# Patient Record
Sex: FEMALE | Race: WHITE | Employment: UNEMPLOYED | URBAN - METROPOLITAN AREA
[De-identification: names, ages, dates, MRNs, and addresses within clinical notes are randomized per-mention and may not be internally consistent; named-entity substitution may affect disease eponyms.]

---

## 2019-11-14 ENCOUNTER — HOSPITAL ENCOUNTER (EMERGENCY)
Age: 44
Discharge: HOME | End: 2019-11-15
Attending: EMERGENCY MEDICINE
Payer: COMMERCIAL

## 2019-11-14 ENCOUNTER — APPOINTMENT (OUTPATIENT)
Dept: GENERAL RADIOLOGY | Age: 44
End: 2019-11-14
Payer: COMMERCIAL

## 2019-11-14 VITALS
DIASTOLIC BLOOD PRESSURE: 65 MMHG | RESPIRATION RATE: 18 BRPM | TEMPERATURE: 97.8 F | OXYGEN SATURATION: 100 % | WEIGHT: 120 LBS | BODY MASS INDEX: 20.49 KG/M2 | HEART RATE: 103 BPM | SYSTOLIC BLOOD PRESSURE: 107 MMHG | HEIGHT: 64 IN
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
## 2019-11-14 DIAGNOSIS — S39.012A STRAIN OF LUMBAR REGION, INITIAL ENCOUNTER: Primary | ICD-10-CM

## 2019-11-14 LAB — HCG(URINE) PREGNANCY TEST: NEGATIVE

## 2019-11-14 PROCEDURE — 84703 CHORIONIC GONADOTROPIN ASSAY: CPT

## 2019-11-14 PROCEDURE — 6370000000 HC RX 637 (ALT 250 FOR IP): Performed by: PHYSICIAN ASSISTANT

## 2019-11-14 PROCEDURE — 72100 X-RAY EXAM L-S SPINE 2/3 VWS: CPT

## 2019-11-14 PROCEDURE — 99283 EMERGENCY DEPT VISIT LOW MDM: CPT

## 2019-11-14 RX ORDER — LIDOCAINE 4 G/G
1 PATCH TOPICAL ONCE
Status: DISCONTINUED | OUTPATIENT
Start: 2019-11-14 | End: 2019-11-15 | Stop reason: HOSPADM

## 2019-11-14 RX ORDER — OXYCODONE HYDROCHLORIDE 5 MG/1
5 TABLET ORAL ONCE
Status: COMPLETED | OUTPATIENT
Start: 2019-11-14 | End: 2019-11-14

## 2019-11-14 RX ORDER — DRONABINOL 5 MG/1
CAPSULE ORAL
Refills: 0 | COMMUNITY
Start: 2019-11-13

## 2019-11-14 RX ORDER — DIMETHYL FUMARATE 240 MG/1
240 CAPSULE ORAL
COMMUNITY
Start: 2018-08-23

## 2019-11-14 RX ORDER — METHOCARBAMOL 500 MG/1
1000 TABLET, FILM COATED ORAL ONCE
Status: COMPLETED | OUTPATIENT
Start: 2019-11-14 | End: 2019-11-14

## 2019-11-14 RX ADMIN — OXYCODONE HYDROCHLORIDE 5 MG: 5 TABLET ORAL at 22:59

## 2019-11-14 RX ADMIN — METHOCARBAMOL TABLETS 1000 MG: 500 TABLET, COATED ORAL at 22:59

## 2019-11-14 ASSESSMENT — PAIN SCALES - GENERAL
PAINLEVEL_OUTOF10: 10
PAINLEVEL_OUTOF10: 10

## 2019-11-14 ASSESSMENT — PAIN DESCRIPTION - ORIENTATION: ORIENTATION: LOWER

## 2019-11-14 ASSESSMENT — PAIN DESCRIPTION - DESCRIPTORS: DESCRIPTORS: ACHING

## 2019-11-14 ASSESSMENT — PAIN DESCRIPTION - LOCATION: LOCATION: BACK

## 2019-11-14 ASSESSMENT — PAIN DESCRIPTION - FREQUENCY: FREQUENCY: CONTINUOUS

## 2019-11-14 ASSESSMENT — PAIN DESCRIPTION - PAIN TYPE: TYPE: ACUTE PAIN

## 2019-11-15 RX ORDER — METHOCARBAMOL 500 MG/1
1000 TABLET, FILM COATED ORAL 3 TIMES DAILY
Qty: 60 TABLET | Refills: 0 | Status: SHIPPED | OUTPATIENT
Start: 2019-11-15 | End: 2019-11-25

## 2019-11-15 RX ORDER — LIDOCAINE 50 MG/G
1 PATCH TOPICAL DAILY
Qty: 10 PATCH | Refills: 0 | Status: SHIPPED | OUTPATIENT
Start: 2019-11-15 | End: 2019-11-25

## 2019-11-15 RX ORDER — OXYCODONE HYDROCHLORIDE 5 MG/1
5 TABLET ORAL EVERY 6 HOURS PRN
Qty: 12 TABLET | Refills: 0 | Status: SHIPPED | OUTPATIENT
Start: 2019-11-15 | End: 2019-11-20

## 2019-11-25 ASSESSMENT — ENCOUNTER SYMPTOMS
EYE ITCHING: 0
CHEST TIGHTNESS: 0
NAUSEA: 0
COUGH: 0
COLOR CHANGE: 0
SINUS PRESSURE: 0
ABDOMINAL PAIN: 0
EYE PAIN: 0
ABDOMINAL DISTENTION: 0
EYE REDNESS: 0
BACK PAIN: 1
SORE THROAT: 0
SHORTNESS OF BREATH: 0
WHEEZING: 0
DIARRHEA: 0
RHINORRHEA: 0
VOMITING: 0

## 2020-01-16 ENCOUNTER — HOSPITAL ENCOUNTER (EMERGENCY)
Age: 45
Discharge: HOME | End: 2020-01-17
Attending: EMERGENCY MEDICINE
Payer: COMMERCIAL

## 2020-01-16 PROCEDURE — 99283 EMERGENCY DEPT VISIT LOW MDM: CPT

## 2020-01-16 ASSESSMENT — PAIN SCALES - GENERAL: PAINLEVEL_OUTOF10: 8

## 2020-01-17 VITALS
HEART RATE: 76 BPM | SYSTOLIC BLOOD PRESSURE: 110 MMHG | OXYGEN SATURATION: 100 % | RESPIRATION RATE: 16 BRPM | DIASTOLIC BLOOD PRESSURE: 81 MMHG | TEMPERATURE: 97.9 F

## 2020-01-17 LAB
ANION GAP SERPL CALCULATED.3IONS-SCNC: 12 MMOL/L (ref 3–16)
BASOPHILS ABSOLUTE: 0 K/UL (ref 0–0.2)
BASOPHILS RELATIVE PERCENT: 1 %
BILIRUBIN URINE: NEGATIVE
BLOOD, URINE: NEGATIVE
BUN BLDV-MCNC: 10 MG/DL (ref 7–20)
CALCIUM SERPL-MCNC: 9.6 MG/DL (ref 8.3–10.6)
CHLORIDE BLD-SCNC: 97 MMOL/L (ref 99–110)
CLARITY: CLEAR
CO2: 30 MMOL/L (ref 21–32)
COLOR: YELLOW
CREAT SERPL-MCNC: <0.5 MG/DL (ref 0.6–1.1)
EOSINOPHILS ABSOLUTE: 0.2 K/UL (ref 0–0.6)
EOSINOPHILS RELATIVE PERCENT: 5.2 %
GFR AFRICAN AMERICAN: >60
GFR NON-AFRICAN AMERICAN: >60
GLUCOSE BLD-MCNC: 89 MG/DL (ref 70–99)
GLUCOSE URINE: NEGATIVE MG/DL
HCT VFR BLD CALC: 36.8 % (ref 36–48)
HEMOGLOBIN: 12.1 G/DL (ref 12–16)
KETONES, URINE: NEGATIVE MG/DL
LEUKOCYTE ESTERASE, URINE: NEGATIVE
LYMPHOCYTES ABSOLUTE: 1.1 K/UL (ref 1–5.1)
LYMPHOCYTES RELATIVE PERCENT: 27.3 %
MCH RBC QN AUTO: 29.9 PG (ref 26–34)
MCHC RBC AUTO-ENTMCNC: 33 G/DL (ref 31–36)
MCV RBC AUTO: 90.4 FL (ref 80–100)
MICROSCOPIC EXAMINATION: NORMAL
MONOCYTES ABSOLUTE: 0.3 K/UL (ref 0–1.3)
MONOCYTES RELATIVE PERCENT: 8.3 %
NEUTROPHILS ABSOLUTE: 2.4 K/UL (ref 1.7–7.7)
NEUTROPHILS RELATIVE PERCENT: 58.2 %
NITRITE, URINE: NEGATIVE
PDW BLD-RTO: 13.6 % (ref 12.4–15.4)
PH UA: 6.5 (ref 5–8)
PLATELET # BLD: 274 K/UL (ref 135–450)
PMV BLD AUTO: 7.7 FL (ref 5–10.5)
POTASSIUM SERPL-SCNC: 3.7 MMOL/L (ref 3.5–5.1)
PROTEIN UA: NEGATIVE MG/DL
RBC # BLD: 4.07 M/UL (ref 4–5.2)
SODIUM BLD-SCNC: 139 MMOL/L (ref 136–145)
SPECIFIC GRAVITY UA: 1.01 (ref 1–1.03)
URINE TYPE: NORMAL
UROBILINOGEN, URINE: 0.2 E.U./DL
WBC # BLD: 4.2 K/UL (ref 4–11)

## 2020-01-17 PROCEDURE — 80048 BASIC METABOLIC PNL TOTAL CA: CPT

## 2020-01-17 PROCEDURE — 81003 URINALYSIS AUTO W/O SCOPE: CPT

## 2020-01-17 PROCEDURE — 85025 COMPLETE CBC W/AUTO DIFF WBC: CPT

## 2020-01-17 RX ORDER — DOCUSATE SODIUM 100 MG/1
100 CAPSULE, LIQUID FILLED ORAL 2 TIMES DAILY
Qty: 60 CAPSULE | Refills: 0 | Status: SHIPPED | OUTPATIENT
Start: 2020-01-17 | End: 2020-02-16

## 2020-01-17 RX ORDER — MAGNESIUM CARB/ALUMINUM HYDROX 105-160MG
296 TABLET,CHEWABLE ORAL ONCE
Qty: 1 BOTTLE | Refills: 0 | Status: SHIPPED | OUTPATIENT
Start: 2020-01-17 | End: 2020-01-17

## 2020-01-17 RX ORDER — KETOROLAC TROMETHAMINE 30 MG/ML
30 INJECTION, SOLUTION INTRAMUSCULAR; INTRAVENOUS ONCE
Status: DISCONTINUED | OUTPATIENT
Start: 2020-01-17 | End: 2020-01-17 | Stop reason: HOSPADM

## 2020-01-17 RX ORDER — POLYETHYLENE GLYCOL 3350 17 G/17G
17 POWDER, FOR SOLUTION ORAL DAILY
Qty: 1 BOTTLE | Refills: 0 | Status: SHIPPED | OUTPATIENT
Start: 2020-01-17 | End: 2020-01-24

## 2020-01-17 ASSESSMENT — ENCOUNTER SYMPTOMS
VOMITING: 0
CHEST TIGHTNESS: 0
NAUSEA: 0
CONSTIPATION: 1
BACK PAIN: 1
SHORTNESS OF BREATH: 0

## 2020-01-17 NOTE — ED PROVIDER NOTES
[address] [facility]          ATTENDING PHYSICIAN NOTE       Date of evaluation: 1/16/2020    Chief Complaint     Constipation and Abdominal Pain      History of Present Illness     Brooks Haider is a 40 y.o. female who presents with suprapubic abdominal pain and constipation. Patient has a history of uterine prolapse and she believes her uterus is prolapsed again. She recently finished her menstrual cycle a couple days ago. She also reports constipation, last bowel movement was 4 days ago. .  She denied any nausea or vomiting. She intermittently takes MiraLAX but does not take this regularly. She has increased urinary frequency but denied any dysuria or hematuria. She notes lower back pain but it is bilateral and constant. Denied any fevers or chills. Review of Systems     Review of Systems   Constitutional: Negative for activity change, appetite change, chills and fever. Respiratory: Negative for chest tightness and shortness of breath. Cardiovascular: Negative for chest pain. Gastrointestinal: Positive for constipation. Negative for nausea and vomiting. Suprapubic abdominal pain    Genitourinary: Positive for frequency and vaginal pain. Negative for dysuria, flank pain and hematuria. Musculoskeletal: Positive for back pain. Skin: Negative. Neurological: Negative for dizziness and syncope. Past Medical, Surgical, Family, and Social History     She has a past medical history of Anxiety, Brain tumor ([address] Depression, MS (multiple sclerosis) ([address] Multiple sclerosis ([address] Oligodendroglioma ([address] and Pap smear for cervical cancer screening. She has a past surgical history that includes Thyroidectomy, partial; Brain Biopsy (2008); [place] tooth extraction; and Thyroidectomy. Her family history includes Cancer in her maternal grandmother. She reports that she quit smoking about 8 years ago. Her smoking use included cigarettes.  She has a %    MCV 90.4 80.0 - 100.0 fL    MCH 29.9 26.0 - 34.0 pg    MCHC 33.0 31.0 - 36.0 g/dL    RDW 13.6 12.4 - 15.4 %    Platelets 212 031 - 350 K/uL    MPV 7.7 5.0 - 10.5 fL    Neutrophils % 58.2 %    Lymphocytes % 27.3 %    Monocytes % 8.3 %    Eosinophils % 5.2 %    Basophils % 1.0 %    Neutrophils Absolute 2.4 1.7 - 7.7 K/uL    Lymphocytes Absolute 1.1 1.0 - 5.1 K/uL    Monocytes Absolute 0.3 0.0 - 1.3 K/uL    Eosinophils Absolute 0.2 0.0 - 0.6 K/uL    Basophils Absolute 0.0 0.0 - 0.2 K/uL   Basic Metabolic Panel   Result Value Ref Range    Sodium 139 136 - 145 mmol/L    Potassium 3.7 3.5 - 5.1 mmol/L    Chloride 97 (L) 99 - 110 mmol/L    CO2 30 21 - 32 mmol/L    Anion Gap 12 3 - 16    Glucose 89 70 - 99 mg/dL    BUN 10 7 - 20 mg/dL    CREATININE <0.5 (L) 0.6 - 1.1 mg/dL    GFR Non-African American >60 >60    GFR African American >60 >60    Calcium 9.6 8.3 - 10.6 mg/dL   Urinalysis   Result Value Ref Range    Color, UA Yellow Straw/Yellow    Clarity, UA Clear Clear    Glucose, Ur Negative Negative mg/dL    Bilirubin Urine Negative Negative    Ketones, Urine Negative Negative mg/dL    Specific Gravity, UA 1.010 1.005 - 1.030    Blood, Urine Negative Negative    pH, UA 6.5 5.0 - 8.0    Protein, UA Negative Negative mg/dL    Urobilinogen, Urine 0.2 <2.0 E.U./dL    Nitrite, Urine Negative Negative    Leukocyte Esterase, Urine Negative Negative    Microscopic Examination Not Indicated     Urine Type Voided        RECENT VITALS:  BP: 110/81,Temp: 97.9 °F (36.6 °C), Pulse: 76, Resp: 16, SpO2: 100 %     Procedures     none    ED Course     Nursing Notes, Past Medical Hx, Past Surgical Hx, Social Hx,Allergies, and Family Hx were reviewed.     patient was given the following medications:  Orders Placed This Encounter   Medications    DISCONTD: ketorolac (TORADOL) injection 30 mg    polyethylene glycol (MIRALAX) powder     Sig: Take 17 g by mouth daily for 7 days PRN constipation     Dispense:  1 Bottle     Refill:  0    docusate sodium (COLACE) 100 MG capsule     Sig: Take 1 capsule by mouth 2 times daily     Dispense:  60 capsule     Refill:  0    Magnesium Citrate 1.745 GM/30ML solution     Sig: Take 296 mLs by mouth once for 1 dose     Dispense:  1 Bottle     Refill:  0       CONSULTS:  IP CONSULT TO OB GYN    MEDICAL DECISIONMAKING / ASSESSMENT / Yusef Tk is a 40 y.o. female who presents with lower abdominal pain and constipation. Patient has a history of uterine prolapse. She believes she may have recurrence of her uterine prolapse since she has been straining to have a bowel movement. Last bowel movement was 4 days ago. She is passing gas. Patient intermittently takes MiraLAX does not take this regularly. On exam patient had no anterior abdominal tenderness to palpation, no rebound or guarding, no tympany to percussion, no clinical evidence of ileus or small bowel obstruction. Chaperoned pelvic exam was performed which showed uterine prolapse approximately 4 inches, mucosa was pink, rectal exam did not show any evidence of rectal prolapse. I spoke to gynecology on call (Dr. Sonya Hayden) no acute ED or inpatient interventions are needed at this time. Patient can follow-up as an outpatient with either her OB/GYN or a uro-gynecologist.  Patient told me she has been referred to a uro-gynecologist but has not followed up with them. Patient was able to urinate. Urinalysis was normal and reassuring. No evidence of UTI. Patient did not require any labs but asked that I obtain a CBC with differential because this is recommended by her neurologist.  She states she has a hard time getting to various doctors appointments and obtaining a CBC here will save her an extra trip to outpatient lab. This was obtained as well as BMP. Labs were normal and reassuring. Patient was provided the phone number for OB/GYN and a uro-gynecologist.  will follow-up as an outpatient. Clinical Impression     1. Constipation, unspecified constipation type    2. Uterine prolapse        Disposition     PATIENT REFERRED TO:  Maggy Ferro MD  [address] [facility]. [address]  [phone]    Call today      Daphne Adams.   [address] [place] [address]  [phone]    Today        DISCHARGE MEDICATIONS:  Discharge Medication List as of 1/17/2020  2:44 AM      START taking these medications    Details   polyethylene glycol (MIRALAX) powder Take 17 g by mouth daily for 7 days PRN constipation, Disp-1 Bottle, R-0Print      docusate sodium (COLACE) 100 MG capsule Take 1 capsule by mouth 2 times daily, Disp-60 capsule, R-0Print      Magnesium Citrate 1.745 GM/30ML solution Take 296 mLs by mouth once for 1 dose, Disp-1 Bottle, R-0Print             DISPOSITION Decision To Discharge 01/17/2020 02:43:46 AM         Anne-Marie Olvera MD  01/17/20 [id number]

## 2020-01-17 NOTE — ED NOTES
Patient reports pubic pain, diffuse abdominal cramping and constipation x4-5 days. Patient also reports possible uterine prolapse for several months, worsened over the past week. Pain 8/10.       Jose Rowley RN  01/16/20 4953

## 2020-01-17 NOTE — ED NOTES
Discharge instructions and prescriptions reviewed with patient. Pt verbalized understanding. Pt discharged home with family.       Latonya Smith RN  01/17/20 036

## 2020-01-17 NOTE — ED NOTES
Alfredo Marcos MD holding labs until after the consult with Gynecology.      Chivo Bro RN  01/17/20 0062

## 2022-01-13 ENCOUNTER — APPOINTMENT (OUTPATIENT)
Dept: GENERAL RADIOLOGY | Age: 47
End: 2022-01-13
Payer: COMMERCIAL

## 2022-01-13 ENCOUNTER — HOSPITAL ENCOUNTER (EMERGENCY)
Age: 47
Discharge: HOME | End: 2022-01-13
Attending: STUDENT IN AN ORGANIZED HEALTH CARE EDUCATION/TRAINING PROGRAM
Payer: COMMERCIAL

## 2022-01-13 VITALS
BODY MASS INDEX: 20.49 KG/M2 | HEART RATE: 103 BPM | HEIGHT: 64 IN | DIASTOLIC BLOOD PRESSURE: 67 MMHG | SYSTOLIC BLOOD PRESSURE: 115 MMHG | OXYGEN SATURATION: 96 % | WEIGHT: 120 LBS | TEMPERATURE: 97.6 F | RESPIRATION RATE: 16 BRPM

## 2022-01-13 DIAGNOSIS — S60.221A CONTUSION OF RIGHT HAND, INITIAL ENCOUNTER: Primary | ICD-10-CM

## 2022-01-13 DIAGNOSIS — K08.89 PAIN, DENTAL: ICD-10-CM

## 2022-01-13 PROCEDURE — 73110 X-RAY EXAM OF WRIST: CPT

## 2022-01-13 PROCEDURE — 99283 EMERGENCY DEPT VISIT LOW MDM: CPT

## 2022-01-13 PROCEDURE — 73130 X-RAY EXAM OF HAND: CPT

## 2022-01-13 ASSESSMENT — ENCOUNTER SYMPTOMS
CHEST TIGHTNESS: 0
CONSTIPATION: 0
WHEEZING: 0
EYE DISCHARGE: 0
EYE ITCHING: 0
SORE THROAT: 0
RHINORRHEA: 0
VOICE CHANGE: 0
ABDOMINAL PAIN: 0
ANAL BLEEDING: 0
BACK PAIN: 0
SHORTNESS OF BREATH: 0
ABDOMINAL DISTENTION: 0
VOMITING: 0
EYE PAIN: 0
DIARRHEA: 0

## 2022-01-13 ASSESSMENT — PAIN DESCRIPTION - ORIENTATION: ORIENTATION: RIGHT

## 2022-01-13 ASSESSMENT — PAIN DESCRIPTION - PAIN TYPE: TYPE: ACUTE PAIN

## 2022-01-14 ENCOUNTER — HOSPITAL ENCOUNTER (EMERGENCY)
Age: 47
Discharge: HOME | End: 2022-01-14
Attending: STUDENT IN AN ORGANIZED HEALTH CARE EDUCATION/TRAINING PROGRAM
Payer: COMMERCIAL

## 2022-01-14 VITALS
OXYGEN SATURATION: 98 % | RESPIRATION RATE: 23 BRPM | TEMPERATURE: 97.9 F | DIASTOLIC BLOOD PRESSURE: 74 MMHG | HEART RATE: 77 BPM | SYSTOLIC BLOOD PRESSURE: 104 MMHG

## 2022-01-14 DIAGNOSIS — G25.71 AKATHISIA: Primary | ICD-10-CM

## 2022-01-14 LAB
A/G RATIO: 2.2 (ref 1.1–2.2)
ALBUMIN SERPL-MCNC: 4.6 G/DL (ref 3.4–5)
ALP BLD-CCNC: 37 U/L (ref 40–129)
ALT SERPL-CCNC: <5 U/L (ref 10–40)
ANION GAP SERPL CALCULATED.3IONS-SCNC: 15 MMOL/L (ref 3–16)
AST SERPL-CCNC: 15 U/L (ref 15–37)
BASOPHILS ABSOLUTE: 0 K/UL (ref 0–0.2)
BASOPHILS RELATIVE PERCENT: 0.6 %
BILIRUB SERPL-MCNC: 0.3 MG/DL (ref 0–1)
BILIRUBIN URINE: NEGATIVE
BLOOD, URINE: NEGATIVE
BUN BLDV-MCNC: 15 MG/DL (ref 7–20)
CALCIUM SERPL-MCNC: 8.5 MG/DL (ref 8.3–10.6)
CHLORIDE BLD-SCNC: 99 MMOL/L (ref 99–110)
CLARITY: CLEAR
CO2: 22 MMOL/L (ref 21–32)
COLOR: YELLOW
CREAT SERPL-MCNC: <0.5 MG/DL (ref 0.6–1.1)
EOSINOPHILS ABSOLUTE: 0 K/UL (ref 0–0.6)
EOSINOPHILS RELATIVE PERCENT: 0.6 %
GFR AFRICAN AMERICAN: >60
GFR NON-AFRICAN AMERICAN: >60
GLUCOSE BLD-MCNC: 107 MG/DL (ref 70–99)
GLUCOSE URINE: NEGATIVE MG/DL
HCT VFR BLD CALC: 34.6 % (ref 36–48)
HEMOGLOBIN: 11.5 G/DL (ref 12–16)
KETONES, URINE: >=80 MG/DL
LEUKOCYTE ESTERASE, URINE: NEGATIVE
LYMPHOCYTES ABSOLUTE: 0.7 K/UL (ref 1–5.1)
LYMPHOCYTES RELATIVE PERCENT: 11.3 %
MCH RBC QN AUTO: 30.7 PG (ref 26–34)
MCHC RBC AUTO-ENTMCNC: 33.2 G/DL (ref 31–36)
MCV RBC AUTO: 92.4 FL (ref 80–100)
MICROSCOPIC EXAMINATION: ABNORMAL
MONOCYTES ABSOLUTE: 0.3 K/UL (ref 0–1.3)
MONOCYTES RELATIVE PERCENT: 5.6 %
NEUTROPHILS ABSOLUTE: 5 K/UL (ref 1.7–7.7)
NEUTROPHILS RELATIVE PERCENT: 81.9 %
NITRITE, URINE: NEGATIVE
PDW BLD-RTO: 13.5 % (ref 12.4–15.4)
PH UA: 7.5 (ref 5–8)
PLATELET # BLD: 263 K/UL (ref 135–450)
PMV BLD AUTO: 7.7 FL (ref 5–10.5)
POTASSIUM REFLEX MAGNESIUM: 3.8 MMOL/L (ref 3.5–5.1)
PROTEIN UA: NEGATIVE MG/DL
RBC # BLD: 3.75 M/UL (ref 4–5.2)
SODIUM BLD-SCNC: 136 MMOL/L (ref 136–145)
SPECIFIC GRAVITY UA: 1.01 (ref 1–1.03)
TOTAL CK: 98 U/L (ref 26–192)
TOTAL PROTEIN: 6.7 G/DL (ref 6.4–8.2)
URINE TYPE: ABNORMAL
UROBILINOGEN, URINE: 0.2 E.U./DL
WBC # BLD: 6.2 K/UL (ref 4–11)

## 2022-01-14 PROCEDURE — 80053 COMPREHEN METABOLIC PANEL: CPT

## 2022-01-14 PROCEDURE — 99283 EMERGENCY DEPT VISIT LOW MDM: CPT

## 2022-01-14 PROCEDURE — 85025 COMPLETE CBC W/AUTO DIFF WBC: CPT

## 2022-01-14 PROCEDURE — 87086 URINE CULTURE/COLONY COUNT: CPT

## 2022-01-14 PROCEDURE — 6360000002 HC RX W HCPCS: Performed by: PHYSICIAN ASSISTANT

## 2022-01-14 PROCEDURE — 82550 ASSAY OF CK (CPK): CPT

## 2022-01-14 PROCEDURE — 81003 URINALYSIS AUTO W/O SCOPE: CPT

## 2022-01-14 PROCEDURE — 96374 THER/PROPH/DIAG INJ IV PUSH: CPT

## 2022-01-14 RX ORDER — LORAZEPAM 2 MG/ML
1 INJECTION INTRAMUSCULAR ONCE
Status: COMPLETED | OUTPATIENT
Start: 2022-01-14 | End: 2022-01-14

## 2022-01-14 RX ADMIN — LORAZEPAM 1 MG: 2 INJECTION INTRAMUSCULAR; INTRAVENOUS at 18:04

## 2022-01-14 ASSESSMENT — ENCOUNTER SYMPTOMS
SHORTNESS OF BREATH: 0
RESPIRATORY NEGATIVE: 1

## 2022-01-14 NOTE — ED NOTES
Discharge instructions provided to patient by RN at bedside. No further concerns addressed at this time.      Evelyn Quezada RN  01/13/22 1550

## 2022-01-14 NOTE — ED PROVIDER NOTES
[address] [facility] ENCOUNTER          PHYSICIAN ASSISTANT NOTE       Date of evaluation: 1/13/2022    Chief Complaint     Hand Pain (right wrist pain and ring and middle finger pain from a fall. ) and Dental Pain (right sided mouth pain and right ear pain for awhile now. )      History of Present Illness     Lorenzo Norton is a 55 y.o. female with a history of myofascial pain syndrome, depression, anxiety, multiple sclerosis who presents with right third and fourth digit pain, right wrist pain, right dental pain. She states that she has a history of multiple sclerosis and says that she frequently falls. She recently fell and hit her right hand and wrist.  She did not hit her head or lose consciousness. She is unsure of the mechanism of how she hurt her wrist and hand, however she is now complaining of third and fourth digit pain as well as right wrist pain. The pain has been improved with her home medications. She is also complaining of right ear pain, which has been present for approximately a week. She states that around the onset of her symptoms, she had a Fall off of one of the teeth in her right upper molars. She denies any symptoms such as clicking of the TMJ or history of TMJ issues. Review of Systems     Review of Systems   Constitutional: Negative for chills, fatigue and fever. HENT: Positive for ear pain. Negative for congestion, rhinorrhea, sore throat and voice change. Eyes: Negative for pain, discharge and itching. Respiratory: Negative for chest tightness, shortness of breath and wheezing. Cardiovascular: Negative for chest pain, palpitations and leg swelling. Gastrointestinal: Negative for abdominal distention, abdominal pain, anal bleeding, constipation, diarrhea and vomiting. Genitourinary: Negative for dysuria, enuresis, flank pain, frequency, hematuria and pelvic pain. Musculoskeletal: Negative for back pain, myalgias and neck pain. Right third, fourth digit pain, right wrist pain   Skin: Positive for rash. Neurological: Negative for dizziness, syncope, facial asymmetry, speech difficulty, weakness, light-headedness, numbness and headaches. Falls   Psychiatric/Behavioral: Negative for agitation, behavioral problems and confusion. Past Medical, Surgical, Family, and Social History     She has a past medical history of Anxiety, Brain tumor ([address] Depression, MS (multiple sclerosis) ([facility] [address] Multiple sclerosis ([facility] [address] Oligodendroglioma ([facility] [address] and Pap smear for cervical cancer screening. She has a past surgical history that includes Thyroidectomy, partial; Brain Biopsy (2008); [place] tooth extraction; and Thyroidectomy. Her family history includes Cancer in her maternal grandmother. She reports that she quit smoking about 10 years ago. Her smoking use included cigarettes. She has a 4.50 pack-year smoking history. She has never used smokeless tobacco. She reports current alcohol use. She reports current drug use. Drug: Marijuana Willard Zhong). Medications     Discharge Medication List as of 1/13/2022  8:59 PM      CONTINUE these medications which have NOT CHANGED    Details   dimethyl fumarate (TECFIDERA) 240 MG delayed release capsule Take 240 mg by mouthHistorical Med      dronabinol (MARINOL) 5 MG capsule TK ONE C PO  BID, R-0Historical Med      diazepam (VALIUM) 2 MG tablet Take 2 mg by mouth nightly as needed for Anxiety. Prenatal Multivit-Min-Fe-FA (PRENATAL #2 PO) Take 1 tablet by mouth daily. duloxetine (CYMBALTA) 60 MG capsule Take 60 mg by mouth daily. Allergies     She has No Known Allergies. Physical Exam     INITIAL VITALS: BP: 115/67, Temp: 97.6 °F (36.4 °C), Pulse: 103, Resp: 16, SpO2: 96 %  Physical Exam  Constitutional:       General: She is not in acute distress. Appearance: Normal appearance. She is normal weight. She is not ill-appearing or toxic-appearing.    HENT:      Head: Normocephalic and atraumatic. Right Ear: Tympanic membrane, ear canal and external ear normal.      Left Ear: Tympanic membrane, ear canal and external ear normal.      Nose: Nose normal. No congestion or rhinorrhea. Mouth/Throat:      Mouth: Mucous membranes are moist.      Pharynx: No oropharyngeal exudate or posterior oropharyngeal erythema. Eyes:      Extraocular Movements: Extraocular movements intact. Conjunctiva/sclera: Conjunctivae normal.      Pupils: Pupils are equal, round, and reactive to light. Cardiovascular:      Rate and Rhythm: Normal rate and regular rhythm. Pulses: Normal pulses. Heart sounds: Normal heart sounds. No murmur heard. No gallop. Pulmonary:      Effort: Pulmonary effort is normal. No respiratory distress. Breath sounds: Normal breath sounds. No wheezing, rhonchi or rales. Abdominal:      General: Abdomen is flat. Bowel sounds are normal. There is no distension. Palpations: Abdomen is soft. Tenderness: There is no abdominal tenderness. There is no guarding or rebound. Musculoskeletal:      Cervical back: Normal range of motion and neck supple. Comments: Normal range of motion of all of the fingers and wrist of the right upper extremity. She has diffuse tenderness of the third and fourth digits with no gross deformities other than very mild ecchymosis of the DIP of the third digit. Diffuse tenderness at the wrist, no point tenderness. No scaphoid tenderness. Radial pulse 2+, sensation is intact. No other injuries noted. Skin:     General: Skin is warm and dry. Capillary Refill: Capillary refill takes less than 2 seconds. Findings: No rash. Neurological:      Mental Status: She is alert. Comments: Rhythmic, spastic movements of the upper extremities and face.  Shuffling gait   Psychiatric:      Comments: Anxious appearing         Diagnostic Results       RADIOLOGY:  XR WRIST RIGHT (MIN 3 VIEWS)   Final Result No sign of any fracture or deformity involving the right wrist.             THREE VIEWS OF THE RIGHT  HAND      HISTORY: Trauma and pain. PROCEDURE: AP, Lateral and Oblique views were obtained      FINDINGS:       Multiple views obtained demonstrate no sign of any acute fracture, dislocation or bony defect. There is no sign of a radiopaque foreign body or erosion      IMPRESSION:       Normal appearing right hand. XR HAND RIGHT (MIN 3 VIEWS)   Final Result      No sign of any fracture or deformity involving the right wrist.             THREE VIEWS OF THE RIGHT  HAND      HISTORY: Trauma and pain. PROCEDURE: AP, Lateral and Oblique views were obtained      FINDINGS:       Multiple views obtained demonstrate no sign of any acute fracture, dislocation or bony defect. There is no sign of a radiopaque foreign body or erosion      IMPRESSION:       Normal appearing right hand. LABS:   No results found for this visit on 01/13/22. ED BEDSIDE ULTRASOUND:  none    RECENT VITALS:  BP: 115/67, Temp: 97.6 °F (36.4 °C), Pulse: 103, Resp: 16, SpO2: 96 %     Procedures     none    ED Course     Nursing Notes, Past Medical Hx,Past Surgical Hx, Social Hx, Allergies, and Family Hx were reviewed. The patient was given the following medications:  No orders of the defined types were placed in this encounter. CONSULTS:  None    MEDICAL DECISION MAKING / ASSESSMENT / Krupa Edouard is a 55 y.o. female with a history of chronic pain, multiple sclerosis and frequent falls that presents with right finger pain and wrist pain after sustaining a fall. She did not hit her head or lose consciousness. She is not on blood thinners. She was also complaining of right-sided jaw/dental pain that has been occurring for about a week after her crown fell off in the right upper molar.     On physical examination, the patient is in no acute distress although she is anxious appearing. She is moving her right upper extremity, more specifically her fingers and wrists without issue. She has diffuse tenderness to palpation of the third and fourth with mild overlying ecchymosis of the dip of the third digit. She also has diffuse tenderness to palpation of the wrist extending to the middle third of the forearm without any point tenderness or scaphoid tenderness. She has normal range of motion of the affected digits and wrist. She is neurovascularly intact. The examination of her bilateral TMs are unremarkable, therefore otitis media is unlikely. The external ear canal shows no edema or erythema to suggest otitis externa. Intraoral examination reveals that she has a partially missing molar in the right upper jaw with no overlying erythema, fluctuance or point tenderness. Her voice is normal and she is handling her secretions well. For the mouth soft. I have low concern for dental abscess or acute infection that would require antibiotics at this time. X-rays of the right wrist and hand were unremarkable for any osseous abnormalities. Therefore, her pain is likely related to contusion needs to be treated as she has been doing, symptomatically. In regards to her right jaw/ear pain, I have low suspicion for acute otitis media or otitis externa. It is possible that this pain is related to TMJ, however reveals that this is more likely related to the affected tooth, which she had a crown fall off. For this, I recommended that she followed up with dentistry and to continue taking her pain medications as she has been. The patient was given strict return precautions. The patient verbalized understanding and she was discharged in stable condition. This patient was also evaluated by the attending physician. All care plans were discussed and agreed upon. Clinical Impression     1. Contusion of right hand, initial encounter    2.  Pain, dental        Disposition     PATIENT REFERRED TO:  [facility] Emergency Department  [address] [place] [id number]  [phone]  Go to   As needed, If symptoms worsen      Follow-up with your PCP and dentistry          DISCHARGE MEDICATIONS:  Discharge Medication List as of 1/13/2022  8:59 PM          DISPOSITION Decision To Discharge 01/13/2022 08:56:53 PM        KATERINE Mims  01/13/22 [id number]

## 2022-01-14 NOTE — ED NOTES
Bed: A12-12  Expected date:   Expected time:   Means of arrival:   Comments:  [address]  Pt having side effects from parkinsons meds     Sampson Kayser, RN  01/14/22 5505

## 2022-01-15 NOTE — ED PROVIDER NOTES
ED Attending Attestation Note     Date of evaluation: 1/14/2022    This patient was seen by the advanced practice provider. I have seen and examined the patient, agree with the workup, evaluation, management and diagnosis. The care plan has been discussed. My assessment reveals a woman with a history of parkinsonism and MS who presents with agitation and difficulty stopping movements. She reports subjective anxiety as well. On exam, she is moving her head rapidly back-and-forth. She is able to move all of her extremities. Her speech is free of aphasia or dysarthria and she is clearly oriented. Her sensorium is normal.  She does have significant psychomotor agitation. There is no increased tone in the upper extremities and her eye movements are normal with no nystagmus. I had the opportunity to see this patient yesterday for a right arm injury. Her mental status is with increased agitation psychomotor activity, but there is no evidence today of significant dystonic reaction, oculogyric crisis, or true akathisia. Patient be treated symptomatically will obtain some screening laboratory studies.      Yair Campbell MD  01/14/22 2039

## 2022-01-15 NOTE — ED PROVIDER NOTES
[facility] ENCOUNTER          PHYSICIAN ASSISTANT NOTE       Date of evaluation: 1/14/2022    Chief Complaint     Medication Reaction (took extra marijuana edibles and parkinson's medications and now mouth is dry and patient is hyperactive)      History of Present Illness     Logan Evans is a 55 y.o. female who presents for medication reaction. Patient reports she had an edible today and also took a new medication for nausea for the first time, cannot recall the name. Patient is on carbidopa levodopa for parkinsonism from multiple sclerosis. Patient reports she has been taking that as prescribed with no recent changes. Patient feels restless and anxious and reports she is scared. No other medication changes or drug use per patient. No history of similar. No recent illness. Otherwise well. Review of Systems     Review of Systems   Constitutional: Negative. Negative for fever. Respiratory: Negative. Negative for shortness of breath. Cardiovascular: Negative. Negative for chest pain. Musculoskeletal: Negative. Neurological: Negative. Negative for speech difficulty and weakness. Psychiatric/Behavioral: The patient is nervous/anxious. All other systems reviewed and are negative. Past Medical, Surgical, Family, and Social History     She has a past medical history of Anxiety, Brain tumor ([address] Depression, MS (multiple sclerosis) ([address] Multiple sclerosis ([address] Oligodendroglioma ([address] and Pap smear for cervical cancer screening. She has a past surgical history that includes Thyroidectomy, partial; Brain Biopsy (2008); [place] tooth extraction; and Thyroidectomy. Her family history includes Cancer in her maternal grandmother. She reports that she quit smoking about 10 years ago. Her smoking use included cigarettes. She has a 4.50 pack-year smoking history. She has never used smokeless tobacco. She reports current alcohol use.  She reports current drug use. Drug: Marijuana Denice Pierre). Medications     Discharge Medication List as of 1/14/2022  8:44 PM      CONTINUE these medications which have NOT CHANGED    Details   dimethyl fumarate (TECFIDERA) 240 MG delayed release capsule Take 240 mg by mouthHistorical Med      dronabinol (MARINOL) 5 MG capsule TK ONE C PO  BID, R-0Historical Med      diazepam (VALIUM) 2 MG tablet Take 2 mg by mouth nightly as needed for Anxiety. Prenatal Multivit-Min-Fe-FA (PRENATAL #2 PO) Take 1 tablet by mouth daily. duloxetine (CYMBALTA) 60 MG capsule Take 60 mg by mouth daily. Allergies     She has No Known Allergies. Physical Exam     INITIAL VITALS: BP: 104/74, Temp: 97.9 °F (36.6 °C), Pulse: 77, Resp: 23, SpO2: 99 %  Physical Exam  Vitals and nursing note reviewed. Constitutional:       Comments: Anxious, restless   HENT:      Head: Normocephalic and atraumatic. Right Ear: External ear normal.      Left Ear: External ear normal.   Eyes:      Extraocular Movements: Extraocular movements intact. Conjunctiva/sclera: Conjunctivae normal.      Pupils: Pupils are equal, round, and reactive to light. Cardiovascular:      Rate and Rhythm: Normal rate and regular rhythm. Pulmonary:      Effort: Pulmonary effort is normal.      Breath sounds: Normal breath sounds. Musculoskeletal:      Cervical back: Neck supple. Neurological:      Mental Status: She is alert. Comments: AAOx3. Restless, moving all extremities equally. Normal tone all extremities. Psychiatric:      Comments: Anxious, tearful, yelling for help.           Diagnostic Results       RADIOLOGY:  No orders to display       LABS:   Results for orders placed or performed during the hospital encounter of 01/14/22   CBC Auto Differential   Result Value Ref Range    WBC 6.2 4.0 - 11.0 K/uL    RBC 3.75 (L) 4.00 - 5.20 M/uL    Hemoglobin 11.5 (L) 12.0 - 16.0 g/dL    Hematocrit 34.6 (L) 36.0 - 48.0 %    MCV 92.4 80.0 - 100.0 fL    MCH 30.7 26.0 - 34.0 pg    MCHC 33.2 31.0 - 36.0 g/dL    RDW 13.5 12.4 - 15.4 %    Platelets 984 393 - 147 K/uL    MPV 7.7 5.0 - 10.5 fL    Neutrophils % 81.9 %    Lymphocytes % 11.3 %    Monocytes % 5.6 %    Eosinophils % 0.6 %    Basophils % 0.6 %    Neutrophils Absolute 5.0 1.7 - 7.7 K/uL    Lymphocytes Absolute 0.7 (L) 1.0 - 5.1 K/uL    Monocytes Absolute 0.3 0.0 - 1.3 K/uL    Eosinophils Absolute 0.0 0.0 - 0.6 K/uL    Basophils Absolute 0.0 0.0 - 0.2 K/uL   Comprehensive Metabolic Panel w/ Reflex to MG   Result Value Ref Range    Sodium 136 136 - 145 mmol/L    Potassium reflex Magnesium 3.8 3.5 - 5.1 mmol/L    Chloride 99 99 - 110 mmol/L    CO2 22 21 - 32 mmol/L    Anion Gap 15 3 - 16    Glucose 107 (H) 70 - 99 mg/dL    BUN 15 7 - 20 mg/dL    CREATININE <0.5 (L) 0.6 - 1.1 mg/dL    GFR Non-African American >60 >60    GFR African American >60 >60    Calcium 8.5 8.3 - 10.6 mg/dL    Total Protein 6.7 6.4 - 8.2 g/dL    Albumin 4.6 3.4 - 5.0 g/dL    Albumin/Globulin Ratio 2.2 1.1 - 2.2    Total Bilirubin 0.3 0.0 - 1.0 mg/dL    Alkaline Phosphatase 37 (L) 40 - 129 U/L    ALT <5 (L) 10 - 40 U/L    AST 15 15 - 37 U/L   CK   Result Value Ref Range    Total CK 98 26 - 192 U/L   Urinalysis, reflex to microscopic   Result Value Ref Range    Color, UA Yellow Straw/Yellow    Clarity, UA Clear Clear    Glucose, Ur Negative Negative mg/dL    Bilirubin Urine Negative Negative    Ketones, Urine >=80 (A) Negative mg/dL    Specific Gravity, UA 1.015 1.005 - 1.030    Blood, Urine Negative Negative    pH, UA 7.5 5.0 - 8.0    Protein, UA Negative Negative mg/dL    Urobilinogen, Urine 0.2 <2.0 E.U./dL    Nitrite, Urine Negative Negative    Leukocyte Esterase, Urine Negative Negative    Microscopic Examination Not Indicated     Urine Type Voided        ED BEDSIDE ULTRASOUND:      RECENT VITALS:  BP: 104/74, Temp: 97.9 °F (36.6 °C), Pulse: 77, Resp: 23, SpO2: 98 %     Procedures         ED Course     Nursing Notes, Past Medical Hx,Past Surgical Hx, Social Hx, Allergies, and Family Hx were reviewed. The patient was given the following medications:  Orders Placed This Encounter   Medications    LORazepam (ATIVAN) injection 1 mg       CONSULTS:  None    MEDICAL DECISION MAKING / ASSESSMENT / Loco Cartwright is a 55 y.o. female with medication reaction. Patient is anxious on arrival and a restless. Patient took an edible today and a new nausea medication. Patient was given 1 mg of Ativan here. Patient was stable blood counts, renal function, hepatic panel. Urinalysis with ketones but no signs of infection. Patient is significantly improved after the Ativan. Patient is stable and tolerating oral intake. Patient instructed to discontinue her nausea medication and avoid drug use. To continue all of her prescribed medications (other than the nausea medication) as directed. Follow-up with primary care and neurology. Return precautions discussed. This patient was also evaluated by the attending physician. All care plans were discussed and agreed upon. Clinical Impression     1. Akathisia        Disposition     PATIENT REFERRED TO:  MD Nestor Simms 42.   [address] [facility]  [phone]    Schedule an appointment as soon as possible for a visit       [facility] Emergency Department  [address] [facility]  [phone]    As needed, If symptoms worsen      DISCHARGE MEDICATIONS:  Discharge Medication List as of 1/14/2022  8:44 PM          DISPOSITION Decision To Discharge 01/14/2022 08:08:03 PM        Harjit Hernandez PA-C  01/14/22 [id number]

## 2022-01-16 LAB — URINE CULTURE, ROUTINE: NORMAL
